# Patient Record
Sex: FEMALE | Race: WHITE | NOT HISPANIC OR LATINO | Employment: OTHER | URBAN - METROPOLITAN AREA
[De-identification: names, ages, dates, MRNs, and addresses within clinical notes are randomized per-mention and may not be internally consistent; named-entity substitution may affect disease eponyms.]

---

## 2017-09-21 ENCOUNTER — IMPORTED ENCOUNTER (OUTPATIENT)
Dept: URBAN - METROPOLITAN AREA CLINIC 43 | Facility: CLINIC | Age: 67
End: 2017-09-21

## 2017-09-21 PROBLEM — H26.491: Noted: 2017-09-21

## 2017-09-21 PROBLEM — H16.223: Noted: 2017-09-21

## 2017-12-26 ENCOUNTER — APPOINTMENT (RX ONLY)
Dept: URBAN - METROPOLITAN AREA CLINIC 124 | Facility: CLINIC | Age: 67
Setting detail: DERMATOLOGY
End: 2017-12-26

## 2017-12-26 DIAGNOSIS — L73.8 OTHER SPECIFIED FOLLICULAR DISORDERS: ICD-10-CM

## 2017-12-26 PROCEDURE — ? COUNSELING

## 2017-12-26 PROCEDURE — ? TREATMENT REGIMEN

## 2017-12-26 PROCEDURE — 99212 OFFICE O/P EST SF 10 MIN: CPT

## 2017-12-26 ASSESSMENT — LOCATION ZONE DERM: LOCATION ZONE: FACE

## 2017-12-26 ASSESSMENT — LOCATION DETAILED DESCRIPTION DERM
LOCATION DETAILED: LEFT INFERIOR MEDIAL MALAR CHEEK
LOCATION DETAILED: LEFT CENTRAL MALAR CHEEK

## 2017-12-26 ASSESSMENT — LOCATION SIMPLE DESCRIPTION DERM: LOCATION SIMPLE: LEFT CHEEK

## 2017-12-26 NOTE — PROCEDURE: MIPS QUALITY
Quality 226: Preventive Care And Screening: Tobacco Use: Screening And Cessation Intervention: Patient screened for tobacco and never smoked
Quality 130: Documentation Of Current Medications In The Medical Record: Current Medications Documented
Quality 47: Advance Care Plan: Advance Care Planning discussed and documented in the medical record; patient did not wish or was not able to name a surrogate decision maker or provide an advance care plan.
Quality 131: Pain Assessment And Follow-Up: Pain assessment using a standardized tool is documented as negative, no follow-up plan required
Detail Level: Detailed
Quality 111:Pneumonia Vaccination Status For Older Adults: Pneumococcal Vaccination Previously Received
Quality 110: Preventive Care And Screening: Influenza Immunization: Influenza Immunization previously received during influenza season

## 2018-08-08 ENCOUNTER — IMPORTED ENCOUNTER (OUTPATIENT)
Dept: URBAN - METROPOLITAN AREA CLINIC 43 | Facility: CLINIC | Age: 68
End: 2018-08-08

## 2018-08-08 PROBLEM — H16.222: Noted: 2018-08-08

## 2018-09-18 ENCOUNTER — IMPORTED ENCOUNTER (OUTPATIENT)
Dept: URBAN - METROPOLITAN AREA CLINIC 43 | Facility: CLINIC | Age: 68
End: 2018-09-18

## 2018-09-18 ENCOUNTER — PREPPED CHART (OUTPATIENT)
Dept: URBAN - METROPOLITAN AREA CLINIC 32 | Facility: CLINIC | Age: 68
End: 2018-09-18

## 2018-09-18 PROBLEM — H43.813: Noted: 2018-09-18

## 2018-09-18 PROBLEM — H26.493: Noted: 2018-09-18

## 2019-10-23 ASSESSMENT — KERATOMETRY
OD_AXISANGLE2_DEGREES: 35
OD_K2POWER_DIOPTERS: 41.75
OD_K1POWER_DIOPTERS: 42
OD_AXISANGLE_DEGREES: 125
OS_K2POWER_DIOPTERS: 42
OS_K1POWER_DIOPTERS: 42.5
OS_AXISANGLE2_DEGREES: 25
OS_AXISANGLE_DEGREES: 115

## 2019-10-23 ASSESSMENT — VISUAL ACUITY
OD_CC: J1+
OD_CC: 20/25+2
OS_CC: J1+
OS_CC: 20/20

## 2019-10-23 ASSESSMENT — TONOMETRY
OS_IOP_MMHG: 18
OD_IOP_MMHG: 15

## 2019-10-24 ENCOUNTER — ESTABLISHED COMPREHENSIVE EXAM (OUTPATIENT)
Dept: URBAN - METROPOLITAN AREA CLINIC 32 | Facility: CLINIC | Age: 69
End: 2019-10-24

## 2019-10-24 DIAGNOSIS — H26.493: ICD-10-CM

## 2019-10-24 DIAGNOSIS — H02.822: ICD-10-CM

## 2019-10-24 PROCEDURE — 92014 COMPRE OPH EXAM EST PT 1/>: CPT

## 2019-10-24 PROCEDURE — G8785 BP SCRN NO PERF AT INTERVAL: HCPCS

## 2019-10-24 PROCEDURE — 4040F PNEUMOC VAC/ADMIN/RCVD: CPT

## 2019-10-24 PROCEDURE — G9903 PT SCRN TBCO ID AS NON USER: HCPCS

## 2019-10-24 PROCEDURE — G8428 CUR MEDS NOT DOCUMENT: HCPCS

## 2019-10-24 PROCEDURE — G8482 FLU IMMUNIZE ORDER/ADMIN: HCPCS

## 2019-10-24 PROCEDURE — 1036F TOBACCO NON-USER: CPT

## 2019-10-24 ASSESSMENT — KERATOMETRY
OD_AXISANGLE2_DEGREES: 35
OS_AXISANGLE2_DEGREES: 25
OS_K2POWER_DIOPTERS: 42
OD_AXISANGLE_DEGREES: 125
OS_AXISANGLE_DEGREES: 115
OD_K2POWER_DIOPTERS: 41.75
OS_K1POWER_DIOPTERS: 42.5
OD_K1POWER_DIOPTERS: 42

## 2019-10-24 ASSESSMENT — VISUAL ACUITY
OS_CC: J1+
OD_SC: 20/40
OS_CC: 20/20
OS_SC: 20/20
OD_CC: J1+
OD_CC: 20/20

## 2019-10-24 ASSESSMENT — TONOMETRY
OD_IOP_MMHG: 18
OS_IOP_MMHG: 16

## 2020-03-01 ASSESSMENT — VISUAL ACUITY
OD_CC: J1+
OD_CC: 20/25 +2
OS_CC: 20/15-1
OS_OTHER: 20/30.
OD_CC: 20/25+2
OS_CC: 20/20
OD_CC: 20/15
OD_OTHER: 20/40.
OS_CC: 20/20
OD_CC: 20/25+1
OS_CC: 20/15
OS_CC: J1+
OD_CC: J1+
OS_CC: J1+

## 2020-03-01 ASSESSMENT — TONOMETRY
OS_IOP_MMHG: 14.0
OS_IOP_MMHG: 14.0
OS_IOP_MMHG: 18.0
OD_IOP_MMHG: 19.0
OD_IOP_MMHG: 15.0
OD_IOP_MMHG: 16.0

## 2020-03-01 ASSESSMENT — KERATOMETRY
OD_K1POWER_DIOPTERS: 42
OD_AXISANGLE_DEGREES: 35
OS_AXISANGLE2_DEGREES: 130
OS_K1POWER_DIOPTERS: 42.5
OS_AXISANGLE2_DEGREES: 115
OD_K1POWER_DIOPTERS: 42
OS_K2POWER_DIOPTERS: 42
OD_AXISANGLE2_DEGREES: 125
OS_K1POWER_DIOPTERS: 42.25
OD_AXISANGLE_DEGREES: 155
OD_K2POWER_DIOPTERS: 41.75
OS_K2POWER_DIOPTERS: 42
OD_AXISANGLE2_DEGREES: 65
OS_AXISANGLE_DEGREES: 40
OS_AXISANGLE_DEGREES: 25
OD_K2POWER_DIOPTERS: 41.75

## 2020-05-19 ENCOUNTER — APPOINTMENT (RX ONLY)
Dept: URBAN - METROPOLITAN AREA CLINIC 124 | Facility: CLINIC | Age: 70
Setting detail: DERMATOLOGY
End: 2020-05-19

## 2020-05-19 DIAGNOSIS — L82.1 OTHER SEBORRHEIC KERATOSIS: ICD-10-CM

## 2020-05-19 DIAGNOSIS — D18.0 HEMANGIOMA: ICD-10-CM

## 2020-05-19 DIAGNOSIS — L81.4 OTHER MELANIN HYPERPIGMENTATION: ICD-10-CM

## 2020-05-19 DIAGNOSIS — D22 MELANOCYTIC NEVI: ICD-10-CM

## 2020-05-19 PROBLEM — D18.01 HEMANGIOMA OF SKIN AND SUBCUTANEOUS TISSUE: Status: ACTIVE | Noted: 2020-05-19

## 2020-05-19 PROBLEM — D22.61 MELANOCYTIC NEVI OF RIGHT UPPER LIMB, INCLUDING SHOULDER: Status: ACTIVE | Noted: 2020-05-19

## 2020-05-19 PROCEDURE — 99214 OFFICE O/P EST MOD 30 MIN: CPT

## 2020-05-19 PROCEDURE — ? COUNSELING

## 2020-05-19 ASSESSMENT — LOCATION SIMPLE DESCRIPTION DERM
LOCATION SIMPLE: LEFT CHEEK
LOCATION SIMPLE: UPPER BACK
LOCATION SIMPLE: RIGHT SHOULDER
LOCATION SIMPLE: ABDOMEN
LOCATION SIMPLE: RIGHT UPPER BACK

## 2020-05-19 ASSESSMENT — LOCATION ZONE DERM
LOCATION ZONE: ARM
LOCATION ZONE: FACE
LOCATION ZONE: TRUNK

## 2020-05-19 ASSESSMENT — LOCATION DETAILED DESCRIPTION DERM
LOCATION DETAILED: RIGHT INFERIOR MEDIAL UPPER BACK
LOCATION DETAILED: SUPERIOR THORACIC SPINE
LOCATION DETAILED: RIGHT ANTERIOR SHOULDER
LOCATION DETAILED: EPIGASTRIC SKIN
LOCATION DETAILED: LEFT LATERAL MALAR CHEEK

## 2020-11-10 ENCOUNTER — APPOINTMENT (RX ONLY)
Dept: URBAN - METROPOLITAN AREA CLINIC 124 | Facility: CLINIC | Age: 70
Setting detail: DERMATOLOGY
End: 2020-11-10

## 2020-11-10 DIAGNOSIS — L57.0 ACTINIC KERATOSIS: ICD-10-CM

## 2020-11-10 DIAGNOSIS — D22 MELANOCYTIC NEVI: ICD-10-CM

## 2020-11-10 DIAGNOSIS — L82.1 OTHER SEBORRHEIC KERATOSIS: ICD-10-CM

## 2020-11-10 DIAGNOSIS — L81.4 OTHER MELANIN HYPERPIGMENTATION: ICD-10-CM

## 2020-11-10 DIAGNOSIS — D18.0 HEMANGIOMA: ICD-10-CM

## 2020-11-10 PROBLEM — D22.61 MELANOCYTIC NEVI OF RIGHT UPPER LIMB, INCLUDING SHOULDER: Status: ACTIVE | Noted: 2020-11-10

## 2020-11-10 PROBLEM — D18.01 HEMANGIOMA OF SKIN AND SUBCUTANEOUS TISSUE: Status: ACTIVE | Noted: 2020-11-10

## 2020-11-10 PROCEDURE — 17000 DESTRUCT PREMALG LESION: CPT

## 2020-11-10 PROCEDURE — ? COUNSELING

## 2020-11-10 PROCEDURE — ? LIQUID NITROGEN

## 2020-11-10 PROCEDURE — 99214 OFFICE O/P EST MOD 30 MIN: CPT | Mod: 25

## 2020-11-10 ASSESSMENT — LOCATION SIMPLE DESCRIPTION DERM
LOCATION SIMPLE: RIGHT SHOULDER
LOCATION SIMPLE: UPPER BACK
LOCATION SIMPLE: RIGHT UPPER BACK
LOCATION SIMPLE: NOSE
LOCATION SIMPLE: ABDOMEN

## 2020-11-10 ASSESSMENT — LOCATION DETAILED DESCRIPTION DERM
LOCATION DETAILED: EPIGASTRIC SKIN
LOCATION DETAILED: RIGHT ANTERIOR SHOULDER
LOCATION DETAILED: LEFT NASAL DORSUM
LOCATION DETAILED: SUPERIOR THORACIC SPINE
LOCATION DETAILED: RIGHT INFERIOR MEDIAL UPPER BACK

## 2020-11-10 ASSESSMENT — LOCATION ZONE DERM
LOCATION ZONE: NOSE
LOCATION ZONE: TRUNK
LOCATION ZONE: ARM

## 2021-06-01 ENCOUNTER — ESTABLISHED COMPREHENSIVE EXAM (OUTPATIENT)
Dept: URBAN - METROPOLITAN AREA CLINIC 32 | Facility: CLINIC | Age: 71
End: 2021-06-01

## 2021-06-01 DIAGNOSIS — Z96.1: ICD-10-CM

## 2021-06-01 DIAGNOSIS — H26.493: ICD-10-CM

## 2021-06-01 DIAGNOSIS — H04.123: ICD-10-CM

## 2021-06-01 PROCEDURE — 92014 COMPRE OPH EXAM EST PT 1/>: CPT

## 2021-06-01 PROCEDURE — 92015 DETERMINE REFRACTIVE STATE: CPT

## 2021-06-01 ASSESSMENT — VISUAL ACUITY
OD_SC: 20/20-3
OS_SC: 20/20

## 2021-06-01 ASSESSMENT — TONOMETRY
OS_IOP_MMHG: 13
OD_IOP_MMHG: 15

## 2022-06-20 ENCOUNTER — PREPPED CHART (OUTPATIENT)
Dept: URBAN - METROPOLITAN AREA CLINIC 43 | Facility: CLINIC | Age: 72
End: 2022-06-20

## 2022-08-01 ENCOUNTER — ESTABLISHED PATIENT (OUTPATIENT)
Dept: URBAN - METROPOLITAN AREA CLINIC 45 | Facility: CLINIC | Age: 72
End: 2022-08-01

## 2022-08-01 DIAGNOSIS — H52.203: ICD-10-CM

## 2022-08-01 DIAGNOSIS — H52.03: ICD-10-CM

## 2022-08-01 DIAGNOSIS — H52.4: ICD-10-CM

## 2022-08-01 DIAGNOSIS — H43.813: ICD-10-CM

## 2022-08-01 DIAGNOSIS — H04.123: ICD-10-CM

## 2022-08-01 DIAGNOSIS — H26.493: ICD-10-CM

## 2022-08-01 PROCEDURE — 92015 DETERMINE REFRACTIVE STATE: CPT

## 2022-08-01 PROCEDURE — 92014 COMPRE OPH EXAM EST PT 1/>: CPT

## 2022-08-01 ASSESSMENT — VISUAL ACUITY
OU_CC: 20/20
OU_CC: J1+
OD_CC: J1+
OD_SC: 20/30
OD_CC: 20/25-1
OD_SC: >J12
OS_SC: 20/20-1
OU_SC: >J12
OU_SC: 20/20
OS_SC: >J12
OS_CC: J1+
OS_CC: 20/20-1

## 2022-08-01 ASSESSMENT — TONOMETRY
OS_IOP_MMHG: 14
OD_IOP_MMHG: 14

## 2023-08-21 ENCOUNTER — ESTABLISHED PATIENT (OUTPATIENT)
Dept: URBAN - METROPOLITAN AREA CLINIC 45 | Facility: CLINIC | Age: 73
End: 2023-08-21

## 2023-08-21 DIAGNOSIS — H04.123: ICD-10-CM

## 2023-08-21 DIAGNOSIS — H26.493: ICD-10-CM

## 2023-08-21 DIAGNOSIS — H43.813: ICD-10-CM

## 2023-08-21 DIAGNOSIS — H52.03: ICD-10-CM

## 2023-08-21 PROCEDURE — 92015 DETERMINE REFRACTIVE STATE: CPT

## 2023-08-21 PROCEDURE — 92014 COMPRE OPH EXAM EST PT 1/>: CPT

## 2023-08-21 ASSESSMENT — TONOMETRY
OD_IOP_MMHG: 15
OS_IOP_MMHG: 14

## 2023-08-21 ASSESSMENT — VISUAL ACUITY
OS_CC: 20/20
OS_SC: J8
OD_CC: 20/20
OS_SC: 20/20
OD_SC: J8
OD_CC: J1
OU_CC: J1
OU_SC: J6
OD_SC: 20/25
OU_CC: 20/20
OU_SC: 20/20
OS_CC: J1

## 2023-10-16 ENCOUNTER — ESTABLISHED PATIENT (OUTPATIENT)
Dept: URBAN - METROPOLITAN AREA CLINIC 45 | Facility: CLINIC | Age: 73
End: 2023-10-16

## 2023-10-16 DIAGNOSIS — H26.493: ICD-10-CM

## 2023-10-16 DIAGNOSIS — H43.813: ICD-10-CM

## 2023-10-16 DIAGNOSIS — H04.123: ICD-10-CM

## 2023-10-16 PROCEDURE — 99213 OFFICE O/P EST LOW 20 MIN: CPT

## 2023-10-16 ASSESSMENT — VISUAL ACUITY
OD_CC: J1
OU_CC: J1
OS_CC: J1
OD_CC: 20/25-2
OU_CC: 20/20
OS_CC: 20/20

## 2023-10-16 ASSESSMENT — TONOMETRY
OD_IOP_MMHG: 14
OS_IOP_MMHG: 14

## 2024-08-26 ENCOUNTER — COMPREHENSIVE EXAM (OUTPATIENT)
Dept: URBAN - METROPOLITAN AREA CLINIC 43 | Facility: CLINIC | Age: 74
End: 2024-08-26

## 2024-08-26 DIAGNOSIS — H04.123: ICD-10-CM

## 2024-08-26 DIAGNOSIS — H26.493: ICD-10-CM

## 2024-08-26 DIAGNOSIS — H52.4: ICD-10-CM

## 2024-08-26 DIAGNOSIS — H52.03: ICD-10-CM

## 2024-08-26 DIAGNOSIS — Z96.1: ICD-10-CM

## 2024-08-26 DIAGNOSIS — H35.371: ICD-10-CM

## 2024-08-26 DIAGNOSIS — H43.813: ICD-10-CM

## 2024-08-26 PROCEDURE — 92134 CPTRZ OPH DX IMG PST SGM RTA: CPT

## 2024-08-26 PROCEDURE — 92015 DETERMINE REFRACTIVE STATE: CPT

## 2024-08-26 PROCEDURE — 99214 OFFICE O/P EST MOD 30 MIN: CPT

## 2024-08-26 ASSESSMENT — TONOMETRY
OD_IOP_MMHG: 16
OS_IOP_MMHG: 16

## 2024-08-26 ASSESSMENT — VISUAL ACUITY
OU_CC: 20/20
OD_CC: J1
OS_CC: 20/20-1
OD_CC: 20/20
OU_CC: J1
OS_CC: J1